# Patient Record
Sex: FEMALE | Race: WHITE | NOT HISPANIC OR LATINO | Employment: FULL TIME | URBAN - METROPOLITAN AREA
[De-identification: names, ages, dates, MRNs, and addresses within clinical notes are randomized per-mention and may not be internally consistent; named-entity substitution may affect disease eponyms.]

---

## 2022-04-28 ENCOUNTER — OFFICE VISIT (OUTPATIENT)
Dept: URGENT CARE | Age: 52
End: 2022-04-28

## 2022-04-28 VITALS
TEMPERATURE: 97.5 F | DIASTOLIC BLOOD PRESSURE: 90 MMHG | OXYGEN SATURATION: 99 % | SYSTOLIC BLOOD PRESSURE: 156 MMHG | HEART RATE: 75 BPM | RESPIRATION RATE: 17 BRPM

## 2022-04-28 DIAGNOSIS — J40 BRONCHITIS: Primary | ICD-10-CM

## 2022-04-28 PROCEDURE — 99204 OFFICE O/P NEW MOD 45 MIN: CPT | Performed by: INTERNAL MEDICINE

## 2022-04-28 RX ORDER — OMEGA-3/DHA/EPA/FISH OIL 60 MG-90MG
CAPSULE ORAL
COMMUNITY

## 2022-04-28 RX ORDER — AZITHROMYCIN 250 MG/1
TABLET, FILM COATED ORAL
Qty: 6 TABLET | Refills: 0 | Status: SHIPPED | OUTPATIENT
Start: 2022-04-28 | End: 2022-05-03

## 2022-04-28 RX ORDER — ALBUTEROL SULFATE 90 UG/1
AEROSOL, METERED RESPIRATORY (INHALATION)
Qty: 1 EACH | Refills: 0 | Status: SHIPPED | OUTPATIENT
Start: 2022-04-28

## 2022-04-28 RX ORDER — PREDNISONE 20 MG/1
40 TABLET ORAL DAILY
Qty: 6 TABLET | Refills: 0 | Status: SHIPPED | OUTPATIENT
Start: 2022-04-28 | End: 2022-05-01

## 2022-12-01 ENCOUNTER — WALK IN (OUTPATIENT)
Dept: URGENT CARE | Age: 52
End: 2022-12-01

## 2022-12-01 VITALS
OXYGEN SATURATION: 97 % | SYSTOLIC BLOOD PRESSURE: 140 MMHG | RESPIRATION RATE: 18 BRPM | DIASTOLIC BLOOD PRESSURE: 82 MMHG | TEMPERATURE: 100.5 F | HEART RATE: 93 BPM

## 2022-12-01 DIAGNOSIS — J11.1 FLU: ICD-10-CM

## 2022-12-01 DIAGNOSIS — J34.89 SINUS PRESSURE: Primary | ICD-10-CM

## 2022-12-01 LAB
FLUAV AG UPPER RESP QL IA.RAPID: POSITIVE
FLUBV AG UPPER RESP QL IA.RAPID: NEGATIVE
SARS-COV+SARS-COV-2 AG RESP QL IA.RAPID: NOT DETECTED
TEST LOT EXPIRATION DATE: ABNORMAL
TEST LOT NUMBER: ABNORMAL

## 2022-12-01 PROCEDURE — 87428 SARSCOV & INF VIR A&B AG IA: CPT | Performed by: FAMILY MEDICINE

## 2022-12-01 PROCEDURE — 99214 OFFICE O/P EST MOD 30 MIN: CPT | Performed by: FAMILY MEDICINE

## 2022-12-01 RX ORDER — ZOLPIDEM TARTRATE 5 MG/1
5 TABLET ORAL
COMMUNITY
Start: 2022-10-03

## 2022-12-01 RX ORDER — CEFUROXIME AXETIL 500 MG/1
500 TABLET ORAL 2 TIMES DAILY
Qty: 20 TABLET | Refills: 0 | Status: SHIPPED | OUTPATIENT
Start: 2022-12-01 | End: 2022-12-11

## 2022-12-01 ASSESSMENT — PAIN SCALES - GENERAL: PAINLEVEL: 0

## 2023-12-30 ENCOUNTER — HOSPITAL ENCOUNTER (OUTPATIENT)
Age: 53
Discharge: HOME | End: 2023-12-30
Payer: COMMERCIAL

## 2023-12-30 VITALS
SYSTOLIC BLOOD PRESSURE: 132 MMHG | HEART RATE: 73 BPM | DIASTOLIC BLOOD PRESSURE: 84 MMHG | OXYGEN SATURATION: 100 % | HEIGHT: 63 IN | TEMPERATURE: 97 F | RESPIRATION RATE: 16 BRPM

## 2023-12-30 DIAGNOSIS — L03.032 CELLULITIS OF TOE OF LEFT FOOT: Primary | ICD-10-CM

## 2023-12-30 RX ORDER — CEPHALEXIN 500 MG/1
500 CAPSULE ORAL 4 TIMES DAILY
Qty: 28 CAPSULE | Refills: 0 | Status: SHIPPED | OUTPATIENT
Start: 2023-12-30 | End: 2024-01-06

## 2023-12-30 NOTE — ED INITIAL ASSESSMENT (HPI)
Pt c/o fungal infection to left 1st toe x1yr, Pt hit the toe on Thanksgiving and rpt things have gotten worse since

## 2023-12-30 NOTE — DISCHARGE INSTRUCTIONS
Warm soaks 3-4 times per day. You may add a small amount of betadine or hydrogen peroxide or Epsom salt to this water  Take antibiotic as directed. Apply over-the-counter antibiotic ointment and cover with a Band-Aid. Elevate left foot often above the level of the heart is best.  Watch for signs of increasing infection: increased pain, redness, red streaking, swelling, pus-like drainage, fever and seek medical attention.